# Patient Record
Sex: MALE | URBAN - METROPOLITAN AREA
[De-identification: names, ages, dates, MRNs, and addresses within clinical notes are randomized per-mention and may not be internally consistent; named-entity substitution may affect disease eponyms.]

---

## 2024-04-03 ENCOUNTER — TELEPHONE (OUTPATIENT)
Dept: TRANSPLANT | Facility: CLINIC | Age: 54
End: 2024-04-03

## 2024-04-03 NOTE — TELEPHONE ENCOUNTER
"Left message for patient with the breeze survey link.    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

## 2024-09-09 ENCOUNTER — TELEPHONE (OUTPATIENT)
Dept: TRANSPLANT | Facility: CLINIC | Age: 54
End: 2024-09-09

## 2024-09-09 NOTE — TELEPHONE ENCOUNTER
Spoke with donor, would like to change the date of surgery to 12/2/2024.  Let him know I would call the OR to check availability and get back with him tomorrow.  No additional questions at this time.    ----- Message from Nafisa Cochran sent at 9/9/2024 12:40 PM CDT -----  Regarding: Consult/Advisory  Contact: Rhett Payne     Consult/Advisory     Name Of Caller:Rhett Payne         Contact Preference:[phone]     Nature of call:Patient is calling to speak Zulma on dates to harvest organs. Requesting a call back